# Patient Record
Sex: MALE | Race: WHITE | NOT HISPANIC OR LATINO | Employment: PART TIME | URBAN - METROPOLITAN AREA
[De-identification: names, ages, dates, MRNs, and addresses within clinical notes are randomized per-mention and may not be internally consistent; named-entity substitution may affect disease eponyms.]

---

## 2024-01-15 ENCOUNTER — HOSPITAL ENCOUNTER (OUTPATIENT)
Dept: RADIOLOGY | Facility: HOSPITAL | Age: 19
Discharge: HOME | End: 2024-01-15
Payer: COMMERCIAL

## 2024-01-15 DIAGNOSIS — S63.502A UNSPECIFIED SPRAIN OF LEFT WRIST, INITIAL ENCOUNTER: ICD-10-CM

## 2024-01-15 PROCEDURE — 73110 X-RAY EXAM OF WRIST: CPT | Mod: LT,LIO

## 2024-01-15 PROCEDURE — 73110 X-RAY EXAM OF WRIST: CPT | Mod: LEFT SIDE | Performed by: RADIOLOGY

## 2024-01-17 ENCOUNTER — OFFICE VISIT (OUTPATIENT)
Dept: ORTHOPEDIC SURGERY | Facility: CLINIC | Age: 19
End: 2024-01-17
Payer: COMMERCIAL

## 2024-01-17 DIAGNOSIS — S62.009A CLOSED NONDISPLACED FRACTURE OF SCAPHOID, INITIAL ENCOUNTER: Primary | ICD-10-CM

## 2024-01-17 PROCEDURE — 99213 OFFICE O/P EST LOW 20 MIN: CPT | Performed by: INTERNAL MEDICINE

## 2024-01-17 PROCEDURE — 99203 OFFICE O/P NEW LOW 30 MIN: CPT | Performed by: INTERNAL MEDICINE

## 2024-01-17 PROCEDURE — 1036F TOBACCO NON-USER: CPT | Performed by: INTERNAL MEDICINE

## 2024-01-17 ASSESSMENT — PAIN - FUNCTIONAL ASSESSMENT: PAIN_FUNCTIONAL_ASSESSMENT: 0-10

## 2024-01-17 ASSESSMENT — PAIN SCALES - GENERAL: PAINLEVEL_OUTOF10: 1

## 2024-01-17 NOTE — PROGRESS NOTES
Acute Injury New Patient Visit    CC:   Chief Complaint   Patient presents with    Left Wrist - Pain     Lt Wrist Injury in Late Nov /Early Dec; Soccer Injury, Xrays 1/15/24       HPI: Bruno is a 18 y.o. male who presents today for acute left wrist injury sustained while playing soccer about 3-4 months ago. He denies ant fall. He had x-rays taken. He denies any other health concerns        Review of Systems   GENERAL: Negative for malaise, significant weight loss, fever  MUSCULOSKELETAL: See HPI  NEURO:  Negative for numbness / tingling     Past Medical History  History reviewed. No pertinent past medical history.    Medication review  Medication Documentation Review Audit       Reviewed by Colton Hernandez (Technologist) on 01/17/24 at 1055      Medication Order Taking? Sig Documenting Provider Last Dose Status            No Medications to Display                                   Allergies  No Known Allergies    Social History  Social History     Socioeconomic History    Marital status: Single     Spouse name: Not on file    Number of children: Not on file    Years of education: Not on file    Highest education level: Not on file   Occupational History    Not on file   Tobacco Use    Smoking status: Never    Smokeless tobacco: Never   Substance and Sexual Activity    Alcohol use: Not on file    Drug use: Not on file    Sexual activity: Not on file   Other Topics Concern    Not on file   Social History Narrative    Not on file     Social Determinants of Health     Financial Resource Strain: Not on file   Food Insecurity: Not on file   Transportation Needs: Not on file   Physical Activity: Not on file   Stress: Not on file   Social Connections: Not on file   Intimate Partner Violence: Not on file   Housing Stability: Not on file       Surgical History  History reviewed. No pertinent surgical history.    Physical Exam:  GENERAL:  Patient is awake, alert, and oriented to person place and time.  Patient appears well  nourished and well kept.  Affect Calm, Not Acutely Distressed.  HEENT:  Normocephalic, Atraumatic, EOMI  CARDIOVASCULAR:  Hemodynamically stable.  RESPIRATORY:  Normal respirations with unlabored breathing.  Extremity: Left hand and wrist shows skin is intact.  There is no pain of the distal radius or distal ulna.  Pain of the scaphoid bone at the anatomical snuffbox.  EPL tendons intact.  There is no pain over the metacarpal bones.  Satisfactory capillary refill time.  He is neurovasc intact.  Right hand and wrist was examined for comparison.      Diagnostics: x-rays reviwed  XR wrist left 3+ views  Narrative: Interpreted By:  Donald Dunlap,   STUDY:  XR WRIST LEFT 3+ VIEWS; ;  1/15/2024 1:55 pm      INDICATION:  Signs/Symptoms:s63.502a.      COMPARISON:  None.      ACCESSION NUMBER(S):  [id number]      ORDERING CLINICIAN:  GISSELLE RIDDLE      FINDINGS:  Left wrist, four views      There is a nondisplaced fracture through the scaphoid waist. Soft  tissue edema present. There is no dislocation. There is no joint  space narrowing or osteophytosis      Impression: Nondisplaced fracture through the scaphoid waist.          MACRO:  Critical Finding:  See findings. Notification was initiated on  1/16/2024 at 5:40 pm by  Donald Dunlap.  (**-OCF-**) Instructions:      Signed by: Donald Dunlap 1/16/2024 5:41 PM  Dictation workstation:   [id number]      Procedure: None    Assessment:   Left nonunion scaphoid fracture    Plan: Bruno presents for initial evaluation of left wrist injury sustained about 3-4 months ago playing soccer.  He believes he may have injured in October while playing soccer.  Had x-rays taken 2 days ago and revealed a left nonunion scaphoid fracture. We recommend a thumb spica cast IP free and CT scan to evaluate for nonunion fracture and extent of any healing. After the CT scan, he will follow up with Dr. Jones to discuss further treatment options pending CT scan results.  No fracture  charge for today's visit as patient will be following up with Dr. Jones for continued fracture care.    No orders of the defined types were placed in this encounter.     At the conclusion of the visit there were no further questions by the patient/family regarding their plan of care.  Patient was instructed to call or return with any issues, questions, or concerns regarding their injury and/or treatment plan described above.     01/17/24 at 11:04 AM - Aryan Mckeon MD  Scribe Attestation  By signing my name below, I, Tanmay Brunilda, Scribe   attest that this documentation has been prepared under the direction and in the presence of Aryan Mckeon MD.    Office: [phone]    This note was prepared using voice recognition software.  The details of this note are correct and have been reviewed, and corrected to the best of my ability.  Some grammatical errors may persist related to the Dragon software.

## 2024-01-18 ENCOUNTER — ANCILLARY PROCEDURE (OUTPATIENT)
Dept: RADIOLOGY | Facility: CLINIC | Age: 19
End: 2024-01-18
Payer: COMMERCIAL

## 2024-01-18 DIAGNOSIS — T14.8XXA FRACTURE: ICD-10-CM

## 2024-01-18 DIAGNOSIS — S62.009A CLOSED NONDISPLACED FRACTURE OF SCAPHOID, INITIAL ENCOUNTER: ICD-10-CM

## 2024-01-18 PROCEDURE — 73200 CT UPPER EXTREMITY W/O DYE: CPT | Mod: LEFT SIDE | Performed by: RADIOLOGY

## 2024-01-18 PROCEDURE — 73200 CT UPPER EXTREMITY W/O DYE: CPT | Mod: LT

## 2024-01-18 PROCEDURE — 76376 3D RENDER W/INTRP POSTPROCES: CPT | Mod: LEFT SIDE | Performed by: RADIOLOGY

## 2024-01-18 PROCEDURE — 76377 3D RENDER W/INTRP POSTPROCES: CPT | Mod: FR

## 2024-01-22 ENCOUNTER — OFFICE VISIT (OUTPATIENT)
Dept: ORTHOPEDIC SURGERY | Facility: CLINIC | Age: 19
End: 2024-01-22
Payer: COMMERCIAL

## 2024-01-22 ENCOUNTER — LAB (OUTPATIENT)
Dept: LAB | Facility: LAB | Age: 19
End: 2024-01-22
Payer: COMMERCIAL

## 2024-01-22 DIAGNOSIS — E55.9 VITAMIN D INSUFFICIENCY: ICD-10-CM

## 2024-01-22 DIAGNOSIS — E55.9 VITAMIN D INSUFFICIENCY: Primary | ICD-10-CM

## 2024-01-22 LAB — 25(OH)D3 SERPL-MCNC: 26 NG/ML (ref 30–100)

## 2024-01-22 PROCEDURE — 99204 OFFICE O/P NEW MOD 45 MIN: CPT | Performed by: ORTHOPAEDIC SURGERY

## 2024-01-22 PROCEDURE — 36415 COLL VENOUS BLD VENIPUNCTURE: CPT

## 2024-01-22 PROCEDURE — 1036F TOBACCO NON-USER: CPT | Performed by: ORTHOPAEDIC SURGERY

## 2024-01-22 PROCEDURE — 82306 VITAMIN D 25 HYDROXY: CPT

## 2024-01-22 NOTE — PROGRESS NOTES
History present illness: Patient presents today for evaluation of his left wrist.  He was engaged in club soccer where he sustained injury to the left wrist.  He was functioning as a goalie.  He describes a blunt force of the hand in a forced apex ulnar deformity to the left wrist.  He had pain thereafter.  He did not initially seek care.  This injury occurred end of October 2023 or early November 2023.  He had ongoing pain.  He was seen by Dr. Garrison who demonstrated evidence for scaphoid injury.  A CT was performed indicating scaphoid injury.  He presents today for evaluation and treatment.  He is right-hand dominant and otherwise healthy.      Past medical history: The patient's past medical history, family history, social history, and review of systems were documented on the patient medical intake.  The updated data was reviewed in the electronic medical record.  History is negative except otherwise stated in history of present illness.        Physical examination:  General: Alert and oriented to person, place, and time.  No acute distress and breathing comfortably: Pleasant and cooperative with examination.  HEENT: Head is normocephalic and atraumatic.  Neck: Supple, no visible swelling.  Cardiovascular: No palpable tachycardia  Lungs: No audible wheezing or labored breathing  Abdomen: Nondistended.  Extremities: Evaluation of the left upper extremity finds the patient had palpable radial artery at the wrist with brisk capillary refill to all digits.  Patient has intact sensation to axillary radial median and ulnar nerves.  There are no open wounds.  There are no signs of infection.  There is no evidence of lymphedema or lymphatic streaking.  The patient has supple compartments to left arm forearm and hand.  Minimally increased tenderness to anatomic snuffbox on the left as compared to the right.  No appreciable increase in discomfort with palpation of distal pole scaphoid on the right as compared with the  left.      Radiology: X-rays of the left wrist taken recently  system along with CT of the left wrist were reviewed.  There is evidence for proximal pole scaphoid fracture with partial bone bridging.  Healing is not complete.      Assessment: Scaphoid fracture with incomplete bone bridging      Plan: CT findings were reviewed.  It looks as though he has partial bone bridging without evidence for complete healing.  Recommendations were made for immobilization nonweightbearing and serum vitamin D study.  If his vitamin D is low we will call in prescription replacement.  Patient is agreeable with this strategy.  Fiberglas casting material was used to create a well-padded well molded left short arm thumb spica cast.  4-week follow-up for x-rays of left wrist including 3 standard views and a dedicated scaphoid view out of the cast.      Procedure:

## 2024-01-23 ENCOUNTER — DOCUMENTATION (OUTPATIENT)
Dept: ORTHOPEDIC SURGERY | Facility: CLINIC | Age: 19
End: 2024-01-23
Payer: COMMERCIAL

## 2024-01-23 DIAGNOSIS — E55.9 VITAMIN D INSUFFICIENCY: Primary | ICD-10-CM

## 2024-01-23 RX ORDER — ERGOCALCIFEROL 1.25 MG/1
1.25 CAPSULE ORAL
Qty: 6 CAPSULE | Refills: 3 | Status: SHIPPED | OUTPATIENT
Start: 2024-01-23 | End: 2024-07-09

## 2024-02-26 ENCOUNTER — CLINICAL SUPPORT (OUTPATIENT)
Dept: ORTHOPEDIC SURGERY | Facility: CLINIC | Age: 19
End: 2024-02-26
Payer: COMMERCIAL

## 2024-02-26 ENCOUNTER — OFFICE VISIT (OUTPATIENT)
Dept: ORTHOPEDIC SURGERY | Facility: CLINIC | Age: 19
End: 2024-02-26
Payer: COMMERCIAL

## 2024-02-26 DIAGNOSIS — M25.532 LEFT WRIST PAIN: ICD-10-CM

## 2024-02-26 DIAGNOSIS — S62.009A CLOSED NONDISPLACED FRACTURE OF SCAPHOID, INITIAL ENCOUNTER: ICD-10-CM

## 2024-02-26 PROCEDURE — L3809 WHFO W/O JOINTS PRE OTS: HCPCS | Performed by: ORTHOPAEDIC SURGERY

## 2024-02-26 PROCEDURE — 99213 OFFICE O/P EST LOW 20 MIN: CPT | Performed by: ORTHOPAEDIC SURGERY

## 2024-02-26 PROCEDURE — 1036F TOBACCO NON-USER: CPT | Performed by: ORTHOPAEDIC SURGERY

## 2024-02-26 PROCEDURE — 73110 X-RAY EXAM OF WRIST: CPT | Mod: LEFT SIDE | Performed by: ORTHOPAEDIC SURGERY

## 2024-02-26 ASSESSMENT — PAIN - FUNCTIONAL ASSESSMENT: PAIN_FUNCTIONAL_ASSESSMENT: NO/DENIES PAIN

## 2024-02-26 NOTE — PROGRESS NOTES
2/26/2024    Chief Complaint   Patient presents with    Left Wrist - Follow-up, New Patient Visit     Left nonunion scaphoid fracture   DOI: late Nov/early Dec  Xrays today XOP       History of Present Illness:  Patient Bruno Weir , 18 y.o. male, presents today, 2/26/2024, for evaluation of left wrist  subacute scaphoid fracture with incomplete bony bridging, 4 weeks out from immobilization .  Patient reports has been compliant with activity restriction in his cast.  He is taking the vitamin D prescription as prescribed.  He denies any new injury or trauma.  He feels pain and soreness has improved in the last few weeks.       Review of Systems:   GENERAL: Negative  GI: Negative  MUSCULOSKELETAL: See HPI  SKIN: Negative  NEURO:  Negative     Physical Exam:  GENERAL:  Alert and oriented to person, place, and time.  No acute distress and breathing comfortably; pleasant and cooperative with the examination.  HEENT:  Head is normocephalic and atraumatic.  NECK:  Supple, no visible swelling.  CARDIOVASCULAR:  No palpable tachycardia.  LUNGS:  No audible wheezing or labored breathing.  ABDOMEN:  Nondistended.  Extremities: Evaluation of left upper extremity finds the patient to have a palpable radial artery at the wrist with brisk capillary refill to all digits. The patient has intact sensorium to axillary, radial, median and ulnar nerves. There are no open wounds. There are no signs of infection. There is no evidence of lymphedema or lymphatic streaking. The patient has supple compartments of the left arm, forearm and hand.  Full composite fist with no extensor lag or rotational deformity of the digits.  Functional arc of motion through flexion extension pronosupination of the wrist.  He has mild snuffbox tenderness.     Imaging/Test Results:  4 views of the left wrist taken in the [place] office today show evidence of healing left scaphoid fracture with increased callus formation noted.      Assessment:  Left wrist subacute scaphoid fracture with incomplete bony bridging now improving after immobilization and vitamin D supplementation.     Plan:  Continue with activity restrictions/nonweightbearing to the left upper extremity.  We discussed continuing with vitamin D prescription for the full 6-week course and transitioning to over-the-counter daily supplementation.  Transition to removable Velcro thumb spica brace coming out daily for hygiene and gentle range of motion of the digits and wrist.  Follow-up with our office again in 4 weeks for repeat clinical and radiographic exam, x-rays 3 views of the left wrist with a fourth dedicated scaphoid view upon return.  All questions answered at today's visit.    Anita Pham PA-C    In a face to face encounter, I performed a history and physical examination, discussed pertinent diagnostic studies if indicated, and discussed diagnosis and management strategies with both the patient and the mid-level provider. I reviewed the mid-level's note and agree with the documented findings and plan of care.  X-rays demonstrate additional bone bridging through the scaphoid.  Minimal tenderness on today's exam.  Mild stiffness to flexion extension arc.  Patient was converted to Velcro thumb spica splint and recommendations were made for gentle range of motion exercises continuance of nonweightbearing and follow-up in 4 weeks for x-rays of the left wrist including 3 standard views along with a dedicated scaphoid view.

## 2024-03-25 ENCOUNTER — CLINICAL SUPPORT (OUTPATIENT)
Dept: ORTHOPEDIC SURGERY | Facility: CLINIC | Age: 19
End: 2024-03-25
Payer: COMMERCIAL

## 2024-03-25 ENCOUNTER — OFFICE VISIT (OUTPATIENT)
Dept: ORTHOPEDIC SURGERY | Facility: CLINIC | Age: 19
End: 2024-03-25
Payer: COMMERCIAL

## 2024-03-25 DIAGNOSIS — S62.009A CLOSED NONDISPLACED FRACTURE OF SCAPHOID, INITIAL ENCOUNTER: ICD-10-CM

## 2024-03-25 DIAGNOSIS — S62.009A CLOSED NONDISPLACED FRACTURE OF SCAPHOID, INITIAL ENCOUNTER: Primary | ICD-10-CM

## 2024-03-25 PROCEDURE — 1036F TOBACCO NON-USER: CPT | Performed by: ORTHOPAEDIC SURGERY

## 2024-03-25 PROCEDURE — 99213 OFFICE O/P EST LOW 20 MIN: CPT | Performed by: ORTHOPAEDIC SURGERY

## 2024-03-25 PROCEDURE — 73110 X-RAY EXAM OF WRIST: CPT | Mod: LEFT SIDE | Performed by: ORTHOPAEDIC SURGERY

## 2024-03-25 NOTE — PROGRESS NOTES
History present illness: Patient presents today for ongoing follow-up of his left wrist scaphoid fracture.  He presented in a delayed fashion status post injury.  X-rays and CAT scan showed proximal pole fracture with partial bone bridging.  He was placed into a cast.  At last visit he was converted to splint.  He presents today with minimal to no symptoms.        Physical examination:  General: Alert and oriented to person, place, and time.  No acute distress and breathing comfortably: Pleasant and cooperative with examination.  Extremities: Evaluation of the left upper extremity finds the patient had palpable radial artery at the wrist with brisk capillary refill to all digits.  Patient has intact sensation to axillary radial median and ulnar nerves.  There are no open wounds.  There are no signs of infection.  There is no evidence of lymphedema or lymphatic streaking.  The patient has supple compartments to left arm forearm and hand.  Patient shows no focal tenderness as compared to the contralateral to direct palpation over distal pole scaphoid and anatomic snuffbox.      Diagnostic studies: X-rays of the left wrist demonstrate healed scaphoid fracture      Assessment: Healed left wrist scaphoid fracture      Plan: Treatment options were discussed.  Recommendations were made for discontinuation of splint immobilization and slow return back to normal activities without restriction.  Follow-up with me on an as-needed basis.  He was instructed to be mindful of any recurrence of pain and not to ignore that if he experiences it.  He is to come back and see us if he has any issues.      Procedure:        Procedure:

## 2024-12-01 ENCOUNTER — OFFICE VISIT (OUTPATIENT)
Dept: URGENT CARE | Age: 19
End: 2024-12-01
Payer: COMMERCIAL

## 2024-12-01 DIAGNOSIS — J02.9 PHARYNGITIS, UNSPECIFIED ETIOLOGY: Primary | ICD-10-CM

## 2024-12-01 PROCEDURE — 99203 OFFICE O/P NEW LOW 30 MIN: CPT | Performed by: PHYSICIAN ASSISTANT

## 2024-12-01 RX ORDER — AMOXICILLIN 875 MG/1
875 TABLET, FILM COATED ORAL 2 TIMES DAILY
Qty: 20 TABLET | Refills: 0 | Status: SHIPPED | OUTPATIENT
Start: 2024-12-01 | End: 2024-12-11

## 2024-12-01 NOTE — PROGRESS NOTES
Urgent Care Virtual Video Visit    Patient Location: home  Provider Location: [facility]    Video visit completed with realtime synchronous video/audio connection. Informed consent was obtained from the patient. Patient was made aware that my evaluation and diagnosis are limited due to the fact that we are not in the same room during the interview and that this is a virtual encounter that took place via videoconferencing. Patient verbalized understanding.     HPI-Virtual visit for male 19 yr who has a sore throat and URI sxs x 2 days. No fever, cough, ear pain, rash, v/d or abdominal pain. States pus seen on the tonsils.      ROS-as stated in the HPI, is otherwise non contributory    PE-  Alert, oriented and in NAD  Heart RRR  Lungs CTA  Throat-difficult to visualize on computer, but appears erythematous    Assessment:  Pharyngitis      Plan:  Amox 875 mg bid x 10 days  Antipyretics as directed for pain or fever  Increase clear fluids  Pcp follow up this week if not improving or worsening  ER visit anytime 24/7 for acute worsening or changing condition    Patient disposition: Home    Electronically signed by Virtual Urgent Care  3:19 PM

## 2024-12-06 ENCOUNTER — LAB (OUTPATIENT)
Dept: LAB | Facility: LAB | Age: 19
End: 2024-12-06
Payer: COMMERCIAL

## 2024-12-06 DIAGNOSIS — B27.90 INFECTIOUS MONONUCLEOSIS, UNSPECIFIED WITHOUT COMPLICATION: Primary | ICD-10-CM

## 2024-12-06 LAB
25(OH)D3 SERPL-MCNC: 23 NG/ML (ref 30–100)
ALBUMIN SERPL BCP-MCNC: 4.7 G/DL (ref 3.4–5)
ALP SERPL-CCNC: 93 U/L (ref 33–120)
ALT SERPL W P-5'-P-CCNC: 37 U/L (ref 10–52)
ANION GAP SERPL CALC-SCNC: 19 MMOL/L (ref 10–20)
ASO AB SERPL-ACNC: 79 IU/ML (ref 0–250)
AST SERPL W P-5'-P-CCNC: 33 U/L (ref 9–39)
BASOPHILS # BLD MANUAL: 0.27 X10*3/UL (ref 0–0.1)
BASOPHILS NFR BLD MANUAL: 2 %
BILIRUB SERPL-MCNC: 1.8 MG/DL (ref 0–1.2)
BUN SERPL-MCNC: 17 MG/DL (ref 6–23)
CALCIUM SERPL-MCNC: 9.8 MG/DL (ref 8.6–10.3)
CHLORIDE SERPL-SCNC: 95 MMOL/L (ref 98–107)
CHOLEST SERPL-MCNC: 107 MG/DL (ref 0–199)
CHOLESTEROL/HDL RATIO: 3.7
CO2 SERPL-SCNC: 25 MMOL/L (ref 21–32)
CREAT SERPL-MCNC: 1.12 MG/DL (ref 0.5–1.3)
CRP SERPL-MCNC: 15.53 MG/DL
EBV EA IGG SER QL: POSITIVE
EBV NA AB SER QL: NEGATIVE
EBV VCA IGG SER IA-ACNC: POSITIVE
EBV VCA IGM SER IA-ACNC: POSITIVE
EGFRCR SERPLBLD CKD-EPI 2021: >90 ML/MIN/1.73M*2
EOSINOPHIL # BLD MANUAL: 0 X10*3/UL (ref 0–0.7)
EOSINOPHIL NFR BLD MANUAL: 0 %
ERYTHROCYTE [DISTWIDTH] IN BLOOD BY AUTOMATED COUNT: 12.1 % (ref 11.5–14.5)
ERYTHROCYTE [SEDIMENTATION RATE] IN BLOOD BY WESTERGREN METHOD: 25 MM/H (ref 0–15)
GLUCOSE SERPL-MCNC: 98 MG/DL (ref 74–99)
HCT VFR BLD AUTO: 43.9 % (ref 41–52)
HDLC SERPL-MCNC: 28.6 MG/DL
HETEROPH AB SERPLBLD QL IA.RAPID: NEGATIVE
HGB BLD-MCNC: 15.6 G/DL (ref 13.5–17.5)
LDLC SERPL CALC-MCNC: 47 MG/DL
LYMPHOCYTES # BLD MANUAL: 3.38 X10*3/UL (ref 1.2–4.8)
LYMPHOCYTES NFR BLD MANUAL: 25 %
MCH RBC QN AUTO: 28.4 PG (ref 26–34)
MCHC RBC AUTO-ENTMCNC: 35.5 G/DL (ref 32–36)
MCV RBC AUTO: 80 FL (ref 80–100)
MONOCYTES # BLD MANUAL: 1.35 X10*3/UL (ref 0.1–1)
MONOCYTES NFR BLD MANUAL: 10 %
NEUTROPHILS # BLD MANUAL: 8.1 X10*3/UL (ref 1.2–7.7)
NEUTS BAND # BLD MANUAL: 0.27 X10*3/UL (ref 0–0.7)
NEUTS BAND NFR BLD MANUAL: 2 %
NEUTS SEG # BLD MANUAL: 7.83 X10*3/UL (ref 1.2–7)
NEUTS SEG NFR BLD MANUAL: 58 %
NON HDL CHOLESTEROL: 78 MG/DL (ref 0–119)
NRBC BLD-RTO: 0 /100 WBCS (ref 0–0)
PLATELET # BLD AUTO: 268 X10*3/UL (ref 150–450)
POTASSIUM SERPL-SCNC: 3.7 MMOL/L (ref 3.5–5.3)
PROT SERPL-MCNC: 7.8 G/DL (ref 6.4–8.2)
RBC # BLD AUTO: 5.49 X10*6/UL (ref 4.5–5.9)
RBC MORPH BLD: ABNORMAL
SODIUM SERPL-SCNC: 135 MMOL/L (ref 136–145)
TOTAL CELLS COUNTED BLD: 100
TRIGL SERPL-MCNC: 157 MG/DL (ref 0–114)
VARIANT LYMPHS # BLD MANUAL: 0.41 X10*3/UL (ref 0–0.5)
VARIANT LYMPHS NFR BLD: 3 %
VLDL: 31 MG/DL (ref 0–40)
WBC # BLD AUTO: 13.5 X10*3/UL (ref 4.4–11.3)

## 2024-12-06 PROCEDURE — 86664 EPSTEIN-BARR NUCLEAR ANTIGEN: CPT

## 2024-12-06 PROCEDURE — 86140 C-REACTIVE PROTEIN: CPT

## 2024-12-06 PROCEDURE — 86663 EPSTEIN-BARR ANTIBODY: CPT

## 2024-12-06 PROCEDURE — 82306 VITAMIN D 25 HYDROXY: CPT

## 2024-12-06 PROCEDURE — 80061 LIPID PANEL: CPT

## 2024-12-06 PROCEDURE — 85007 BL SMEAR W/DIFF WBC COUNT: CPT

## 2024-12-06 PROCEDURE — 85652 RBC SED RATE AUTOMATED: CPT

## 2024-12-06 PROCEDURE — 80053 COMPREHEN METABOLIC PANEL: CPT

## 2024-12-06 PROCEDURE — 85027 COMPLETE CBC AUTOMATED: CPT

## 2024-12-06 PROCEDURE — 86665 EPSTEIN-BARR CAPSID VCA: CPT

## 2024-12-06 PROCEDURE — 86060 ANTISTREPTOLYSIN O TITER: CPT

## 2024-12-06 PROCEDURE — 36415 COLL VENOUS BLD VENIPUNCTURE: CPT

## 2024-12-06 PROCEDURE — 86308 HETEROPHILE ANTIBODY SCREEN: CPT
